# Patient Record
Sex: FEMALE | Race: WHITE | NOT HISPANIC OR LATINO
[De-identification: names, ages, dates, MRNs, and addresses within clinical notes are randomized per-mention and may not be internally consistent; named-entity substitution may affect disease eponyms.]

---

## 2017-04-25 ENCOUNTER — RESULT REVIEW (OUTPATIENT)
Age: 37
End: 2017-04-25

## 2018-09-21 ENCOUNTER — APPOINTMENT (OUTPATIENT)
Dept: SURGICAL ONCOLOGY | Facility: CLINIC | Age: 38
End: 2018-09-21
Payer: COMMERCIAL

## 2018-09-21 ENCOUNTER — LABORATORY RESULT (OUTPATIENT)
Age: 38
End: 2018-09-21

## 2018-09-21 VITALS
SYSTOLIC BLOOD PRESSURE: 127 MMHG | HEIGHT: 66 IN | WEIGHT: 140 LBS | RESPIRATION RATE: 15 BRPM | HEART RATE: 76 BPM | DIASTOLIC BLOOD PRESSURE: 86 MMHG | BODY MASS INDEX: 22.5 KG/M2

## 2018-09-21 PROCEDURE — 10022: CPT | Mod: 79

## 2018-09-21 PROCEDURE — 99244 OFF/OP CNSLTJ NEW/EST MOD 40: CPT

## 2019-03-15 ENCOUNTER — APPOINTMENT (OUTPATIENT)
Dept: SURGICAL ONCOLOGY | Facility: CLINIC | Age: 39
End: 2019-03-15

## 2019-03-15 DIAGNOSIS — N63.20 UNSPECIFIED LUMP IN THE LEFT BREAST, UNSPECIFIED QUADRANT: ICD-10-CM

## 2019-04-08 ENCOUNTER — APPOINTMENT (OUTPATIENT)
Dept: ANTEPARTUM | Facility: CLINIC | Age: 39
End: 2019-04-08
Payer: COMMERCIAL

## 2019-04-08 ENCOUNTER — ASOB RESULT (OUTPATIENT)
Age: 39
End: 2019-04-08

## 2019-04-08 PROCEDURE — 76811 OB US DETAILED SNGL FETUS: CPT

## 2019-04-08 PROCEDURE — 76817 TRANSVAGINAL US OBSTETRIC: CPT

## 2019-09-02 ENCOUNTER — INPATIENT (INPATIENT)
Facility: HOSPITAL | Age: 39
LOS: 1 days | Discharge: HOME | End: 2019-09-04
Attending: OBSTETRICS & GYNECOLOGY | Admitting: OBSTETRICS & GYNECOLOGY
Payer: COMMERCIAL

## 2019-09-02 VITALS
RESPIRATION RATE: 18 BRPM | OXYGEN SATURATION: 98 % | TEMPERATURE: 98 F | HEART RATE: 109 BPM | SYSTOLIC BLOOD PRESSURE: 97 MMHG | DIASTOLIC BLOOD PRESSURE: 53 MMHG

## 2019-09-02 DIAGNOSIS — O26.899 OTHER SPECIFIED PREGNANCY RELATED CONDITIONS, UNSPECIFIED TRIMESTER: ICD-10-CM

## 2019-09-02 DIAGNOSIS — Z3A.00 WEEKS OF GESTATION OF PREGNANCY NOT SPECIFIED: ICD-10-CM

## 2019-09-02 DIAGNOSIS — Z34.80 ENCOUNTER FOR SUPERVISION OF OTHER NORMAL PREGNANCY, UNSPECIFIED TRIMESTER: ICD-10-CM

## 2019-09-02 LAB
BASOPHILS # BLD AUTO: 0 K/UL — SIGNIFICANT CHANGE UP (ref 0–0.2)
BASOPHILS NFR BLD AUTO: 0.4 % — SIGNIFICANT CHANGE UP (ref 0–2)
BLD GP AB SCN SERPL QL: NEGATIVE — SIGNIFICANT CHANGE UP
EOSINOPHIL # BLD AUTO: 0.1 K/UL — SIGNIFICANT CHANGE UP (ref 0–0.5)
EOSINOPHIL NFR BLD AUTO: 1.2 % — SIGNIFICANT CHANGE UP (ref 0–6)
HCT VFR BLD CALC: 29 % — LOW (ref 34.5–45)
HCT VFR BLD CALC: 33.9 % — LOW (ref 34.5–45)
HGB BLD-MCNC: 11.5 G/DL — SIGNIFICANT CHANGE UP (ref 11.5–15.5)
HGB BLD-MCNC: 9.5 G/DL — LOW (ref 11.5–15.5)
LYMPHOCYTES # BLD AUTO: 2.1 K/UL — SIGNIFICANT CHANGE UP (ref 1–3.3)
LYMPHOCYTES # BLD AUTO: 26.7 % — SIGNIFICANT CHANGE UP (ref 13–44)
MCHC RBC-ENTMCNC: 28.7 PG — SIGNIFICANT CHANGE UP (ref 27–34)
MCHC RBC-ENTMCNC: 29.3 PG — SIGNIFICANT CHANGE UP (ref 27–34)
MCHC RBC-ENTMCNC: 32.6 GM/DL — SIGNIFICANT CHANGE UP (ref 32–36)
MCHC RBC-ENTMCNC: 34.1 GM/DL — SIGNIFICANT CHANGE UP (ref 32–36)
MCV RBC AUTO: 86 FL — SIGNIFICANT CHANGE UP (ref 80–100)
MCV RBC AUTO: 87.8 FL — SIGNIFICANT CHANGE UP (ref 80–100)
MONOCYTES # BLD AUTO: 0.6 K/UL — SIGNIFICANT CHANGE UP (ref 0–0.9)
MONOCYTES NFR BLD AUTO: 8.1 % — SIGNIFICANT CHANGE UP (ref 2–14)
NEUTROPHILS # BLD AUTO: 4.9 K/UL — SIGNIFICANT CHANGE UP (ref 1.8–7.4)
NEUTROPHILS NFR BLD AUTO: 63.6 % — SIGNIFICANT CHANGE UP (ref 43–77)
PLATELET # BLD AUTO: 168 K/UL — SIGNIFICANT CHANGE UP (ref 150–400)
PLATELET # BLD AUTO: 196 K/UL — SIGNIFICANT CHANGE UP (ref 150–400)
RBC # BLD: 3.3 M/UL — LOW (ref 3.8–5.2)
RBC # BLD: 3.94 M/UL — SIGNIFICANT CHANGE UP (ref 3.8–5.2)
RBC # FLD: 12.7 % — SIGNIFICANT CHANGE UP (ref 10.3–14.5)
RBC # FLD: 12.9 % — SIGNIFICANT CHANGE UP (ref 10.3–14.5)
RH IG SCN BLD-IMP: NEGATIVE — SIGNIFICANT CHANGE UP
RH IG SCN BLD-IMP: NEGATIVE — SIGNIFICANT CHANGE UP
T PALLIDUM AB TITR SER: NEGATIVE — SIGNIFICANT CHANGE UP
WBC # BLD: 16.7 K/UL — HIGH (ref 3.8–10.5)
WBC # BLD: 7.8 K/UL — SIGNIFICANT CHANGE UP (ref 3.8–10.5)
WBC # FLD AUTO: 16.7 K/UL — HIGH (ref 3.8–10.5)
WBC # FLD AUTO: 7.8 K/UL — SIGNIFICANT CHANGE UP (ref 3.8–10.5)

## 2019-09-02 RX ORDER — ACETAMINOPHEN 500 MG
1000 TABLET ORAL ONCE
Refills: 0 | Status: COMPLETED | OUTPATIENT
Start: 2019-09-02 | End: 2019-09-02

## 2019-09-02 RX ORDER — SODIUM CHLORIDE 9 MG/ML
1000 INJECTION, SOLUTION INTRAVENOUS ONCE
Refills: 0 | Status: COMPLETED | OUTPATIENT
Start: 2019-09-02 | End: 2019-09-02

## 2019-09-02 RX ORDER — OXYTOCIN 10 UNIT/ML
333.33 VIAL (ML) INJECTION
Qty: 20 | Refills: 0 | Status: COMPLETED | OUTPATIENT
Start: 2019-09-02 | End: 2019-09-02

## 2019-09-02 RX ORDER — OXYCODONE HYDROCHLORIDE 5 MG/1
5 TABLET ORAL ONCE
Refills: 0 | Status: DISCONTINUED | OUTPATIENT
Start: 2019-09-02 | End: 2019-09-04

## 2019-09-02 RX ORDER — TETANUS TOXOID, REDUCED DIPHTHERIA TOXOID AND ACELLULAR PERTUSSIS VACCINE, ADSORBED 5; 2.5; 8; 8; 2.5 [IU]/.5ML; [IU]/.5ML; UG/.5ML; UG/.5ML; UG/.5ML
0.5 SUSPENSION INTRAMUSCULAR ONCE
Refills: 0 | Status: COMPLETED | OUTPATIENT
Start: 2019-09-02

## 2019-09-02 RX ORDER — SODIUM CHLORIDE 9 MG/ML
1000 INJECTION, SOLUTION INTRAVENOUS
Refills: 0 | Status: DISCONTINUED | OUTPATIENT
Start: 2019-09-02 | End: 2019-09-02

## 2019-09-02 RX ORDER — SERTRALINE 25 MG/1
75 TABLET, FILM COATED ORAL DAILY
Refills: 0 | Status: DISCONTINUED | OUTPATIENT
Start: 2019-09-02 | End: 2019-09-04

## 2019-09-02 RX ORDER — MAGNESIUM HYDROXIDE 400 MG/1
30 TABLET, CHEWABLE ORAL
Refills: 0 | Status: DISCONTINUED | OUTPATIENT
Start: 2019-09-02 | End: 2019-09-04

## 2019-09-02 RX ORDER — LEVOTHYROXINE SODIUM 125 MCG
75 TABLET ORAL DAILY
Refills: 0 | Status: DISCONTINUED | OUTPATIENT
Start: 2019-09-02 | End: 2019-09-04

## 2019-09-02 RX ORDER — GLYCERIN ADULT
1 SUPPOSITORY, RECTAL RECTAL AT BEDTIME
Refills: 0 | Status: DISCONTINUED | OUTPATIENT
Start: 2019-09-02 | End: 2019-09-04

## 2019-09-02 RX ORDER — HYDROCORTISONE 1 %
1 OINTMENT (GRAM) TOPICAL EVERY 6 HOURS
Refills: 0 | Status: DISCONTINUED | OUTPATIENT
Start: 2019-09-02 | End: 2019-09-04

## 2019-09-02 RX ORDER — AER TRAVELER 0.5 G/1
1 SOLUTION RECTAL; TOPICAL EVERY 4 HOURS
Refills: 0 | Status: DISCONTINUED | OUTPATIENT
Start: 2019-09-02 | End: 2019-09-04

## 2019-09-02 RX ORDER — KETOROLAC TROMETHAMINE 30 MG/ML
30 SYRINGE (ML) INJECTION ONCE
Refills: 0 | Status: DISCONTINUED | OUTPATIENT
Start: 2019-09-02 | End: 2019-09-02

## 2019-09-02 RX ORDER — PRAMOXINE HYDROCHLORIDE 150 MG/15G
1 AEROSOL, FOAM RECTAL EVERY 4 HOURS
Refills: 0 | Status: DISCONTINUED | OUTPATIENT
Start: 2019-09-02 | End: 2019-09-04

## 2019-09-02 RX ORDER — DIBUCAINE 1 %
1 OINTMENT (GRAM) RECTAL EVERY 6 HOURS
Refills: 0 | Status: DISCONTINUED | OUTPATIENT
Start: 2019-09-02 | End: 2019-09-04

## 2019-09-02 RX ORDER — OXYTOCIN 10 UNIT/ML
333.33 VIAL (ML) INJECTION
Qty: 20 | Refills: 0 | Status: DISCONTINUED | OUTPATIENT
Start: 2019-09-02 | End: 2019-09-04

## 2019-09-02 RX ORDER — SODIUM CHLORIDE 9 MG/ML
3 INJECTION INTRAMUSCULAR; INTRAVENOUS; SUBCUTANEOUS EVERY 8 HOURS
Refills: 0 | Status: DISCONTINUED | OUTPATIENT
Start: 2019-09-02 | End: 2019-09-04

## 2019-09-02 RX ORDER — ACETAMINOPHEN 500 MG
975 TABLET ORAL
Refills: 0 | Status: DISCONTINUED | OUTPATIENT
Start: 2019-09-02 | End: 2019-09-04

## 2019-09-02 RX ORDER — CITRIC ACID/SODIUM CITRATE 300-500 MG
15 SOLUTION, ORAL ORAL EVERY 6 HOURS
Refills: 0 | Status: DISCONTINUED | OUTPATIENT
Start: 2019-09-02 | End: 2019-09-02

## 2019-09-02 RX ORDER — LANOLIN
1 OINTMENT (GRAM) TOPICAL EVERY 6 HOURS
Refills: 0 | Status: DISCONTINUED | OUTPATIENT
Start: 2019-09-02 | End: 2019-09-04

## 2019-09-02 RX ORDER — OXYCODONE HYDROCHLORIDE 5 MG/1
5 TABLET ORAL
Refills: 0 | Status: DISCONTINUED | OUTPATIENT
Start: 2019-09-02 | End: 2019-09-04

## 2019-09-02 RX ORDER — AMPICILLIN TRIHYDRATE 250 MG
2 CAPSULE ORAL ONCE
Refills: 0 | Status: COMPLETED | OUTPATIENT
Start: 2019-09-02 | End: 2019-09-02

## 2019-09-02 RX ORDER — AMPICILLIN TRIHYDRATE 250 MG
2 CAPSULE ORAL ONCE
Refills: 0 | Status: DISCONTINUED | OUTPATIENT
Start: 2019-09-02 | End: 2019-09-04

## 2019-09-02 RX ORDER — SIMETHICONE 80 MG/1
80 TABLET, CHEWABLE ORAL EVERY 4 HOURS
Refills: 0 | Status: DISCONTINUED | OUTPATIENT
Start: 2019-09-02 | End: 2019-09-04

## 2019-09-02 RX ORDER — DIPHENHYDRAMINE HCL 50 MG
25 CAPSULE ORAL EVERY 6 HOURS
Refills: 0 | Status: DISCONTINUED | OUTPATIENT
Start: 2019-09-02 | End: 2019-09-04

## 2019-09-02 RX ORDER — BENZOCAINE 10 %
1 GEL (GRAM) MUCOUS MEMBRANE EVERY 6 HOURS
Refills: 0 | Status: DISCONTINUED | OUTPATIENT
Start: 2019-09-02 | End: 2019-09-04

## 2019-09-02 RX ORDER — OXYTOCIN 10 UNIT/ML
10 VIAL (ML) INJECTION ONCE
Refills: 0 | Status: COMPLETED | OUTPATIENT
Start: 2019-09-02 | End: 2019-09-02

## 2019-09-02 RX ORDER — AMPICILLIN TRIHYDRATE 250 MG
1 CAPSULE ORAL EVERY 4 HOURS
Refills: 0 | Status: DISCONTINUED | OUTPATIENT
Start: 2019-09-02 | End: 2019-09-02

## 2019-09-02 RX ORDER — DOCUSATE SODIUM 100 MG
100 CAPSULE ORAL
Refills: 0 | Status: DISCONTINUED | OUTPATIENT
Start: 2019-09-02 | End: 2019-09-03

## 2019-09-02 RX ORDER — IBUPROFEN 200 MG
600 TABLET ORAL EVERY 6 HOURS
Refills: 0 | Status: COMPLETED | OUTPATIENT
Start: 2019-09-02 | End: 2020-07-31

## 2019-09-02 RX ORDER — IBUPROFEN 200 MG
600 TABLET ORAL EVERY 6 HOURS
Refills: 0 | Status: DISCONTINUED | OUTPATIENT
Start: 2019-09-02 | End: 2019-09-04

## 2019-09-02 RX ADMIN — Medication 400 MILLIGRAM(S): at 08:15

## 2019-09-02 RX ADMIN — Medication 30 MILLIGRAM(S): at 05:00

## 2019-09-02 RX ADMIN — Medication 600 MILLIGRAM(S): at 17:30

## 2019-09-02 RX ADMIN — Medication 600 MILLIGRAM(S): at 11:45

## 2019-09-02 RX ADMIN — Medication 600 MILLIGRAM(S): at 12:30

## 2019-09-02 RX ADMIN — Medication 975 MILLIGRAM(S): at 21:00

## 2019-09-02 RX ADMIN — SODIUM CHLORIDE 1000 MILLILITER(S): 9 INJECTION, SOLUTION INTRAVENOUS at 08:05

## 2019-09-02 RX ADMIN — Medication 10 UNIT(S): at 04:15

## 2019-09-02 RX ADMIN — Medication 1000 MILLIUNIT(S)/MIN: at 05:53

## 2019-09-02 RX ADMIN — Medication 1000 MILLIGRAM(S): at 09:30

## 2019-09-02 RX ADMIN — Medication 1000 MILLIUNIT(S)/MIN: at 08:25

## 2019-09-02 RX ADMIN — Medication 30 MILLIGRAM(S): at 04:20

## 2019-09-02 RX ADMIN — Medication 216 GRAM(S): at 03:59

## 2019-09-02 RX ADMIN — SODIUM CHLORIDE 1000 MILLILITER(S): 9 INJECTION, SOLUTION INTRAVENOUS at 05:06

## 2019-09-02 RX ADMIN — Medication 975 MILLIGRAM(S): at 21:30

## 2019-09-03 LAB
HCT VFR BLD CALC: 17.4 % — CRITICAL LOW (ref 34.5–45)
HCT VFR BLD CALC: 19 % — CRITICAL LOW (ref 34.5–45)
HCT VFR BLD CALC: 19.4 % — CRITICAL LOW (ref 34.5–45)
HGB BLD-MCNC: 6.2 G/DL — CRITICAL LOW (ref 11.5–15.5)
HGB BLD-MCNC: 6.5 G/DL — CRITICAL LOW (ref 11.5–15.5)
HGB BLD-MCNC: 6.8 G/DL — CRITICAL LOW (ref 11.5–15.5)
KLEIHAUER-BETKE CALCULATION: 0 % — SIGNIFICANT CHANGE UP (ref 0–0.3)
MCHC RBC-ENTMCNC: 29.6 PG — SIGNIFICANT CHANGE UP (ref 27–34)
MCHC RBC-ENTMCNC: 30.1 PG — SIGNIFICANT CHANGE UP (ref 27–34)
MCHC RBC-ENTMCNC: 34.4 GM/DL — SIGNIFICANT CHANGE UP (ref 32–36)
MCHC RBC-ENTMCNC: 35 GM/DL — SIGNIFICANT CHANGE UP (ref 32–36)
MCV RBC AUTO: 86.1 FL — SIGNIFICANT CHANGE UP (ref 80–100)
MCV RBC AUTO: 86.2 FL — SIGNIFICANT CHANGE UP (ref 80–100)
PLATELET # BLD AUTO: 116 K/UL — LOW (ref 150–400)
PLATELET # BLD AUTO: 122 K/UL — LOW (ref 150–400)
RBC # BLD: 2.21 M/UL — LOW (ref 3.8–5.2)
RBC # BLD: 2.26 M/UL — LOW (ref 3.8–5.2)
RBC # FLD: 13.2 % — SIGNIFICANT CHANGE UP (ref 10.3–14.5)
RBC # FLD: 13.4 % — SIGNIFICANT CHANGE UP (ref 10.3–14.5)
WBC # BLD: 8.7 K/UL — SIGNIFICANT CHANGE UP (ref 3.8–10.5)
WBC # BLD: 9.8 K/UL — SIGNIFICANT CHANGE UP (ref 3.8–10.5)
WBC # FLD AUTO: 8.7 K/UL — SIGNIFICANT CHANGE UP (ref 3.8–10.5)
WBC # FLD AUTO: 9.8 K/UL — SIGNIFICANT CHANGE UP (ref 3.8–10.5)

## 2019-09-03 RX ORDER — DOCUSATE SODIUM 100 MG
100 CAPSULE ORAL THREE TIMES A DAY
Refills: 0 | Status: DISCONTINUED | OUTPATIENT
Start: 2019-09-03 | End: 2019-09-04

## 2019-09-03 RX ORDER — DIPHENHYDRAMINE HCL 50 MG
25 CAPSULE ORAL ONCE
Refills: 0 | Status: DISCONTINUED | OUTPATIENT
Start: 2019-09-03 | End: 2019-09-04

## 2019-09-03 RX ORDER — FERROUS SULFATE 325(65) MG
325 TABLET ORAL THREE TIMES A DAY
Refills: 0 | Status: DISCONTINUED | OUTPATIENT
Start: 2019-09-03 | End: 2019-09-04

## 2019-09-03 RX ORDER — ACETAMINOPHEN 500 MG
650 TABLET ORAL ONCE
Refills: 0 | Status: DISCONTINUED | OUTPATIENT
Start: 2019-09-03 | End: 2019-09-04

## 2019-09-03 RX ORDER — TETANUS TOXOID, REDUCED DIPHTHERIA TOXOID AND ACELLULAR PERTUSSIS VACCINE, ADSORBED 5; 2.5; 8; 8; 2.5 [IU]/.5ML; [IU]/.5ML; UG/.5ML; UG/.5ML; UG/.5ML
0.5 SUSPENSION INTRAMUSCULAR ONCE
Refills: 0 | Status: COMPLETED | OUTPATIENT
Start: 2019-09-03 | End: 2019-09-03

## 2019-09-03 RX ORDER — ASCORBIC ACID 60 MG
500 TABLET,CHEWABLE ORAL DAILY
Refills: 0 | Status: DISCONTINUED | OUTPATIENT
Start: 2019-09-03 | End: 2019-09-04

## 2019-09-03 RX ORDER — INFLUENZA VIRUS VACCINE 15; 15; 15; 15 UG/.5ML; UG/.5ML; UG/.5ML; UG/.5ML
0.5 SUSPENSION INTRAMUSCULAR ONCE
Refills: 0 | Status: COMPLETED | OUTPATIENT
Start: 2019-09-03 | End: 2019-09-03

## 2019-09-03 RX ADMIN — Medication 325 MILLIGRAM(S): at 16:04

## 2019-09-03 RX ADMIN — Medication 975 MILLIGRAM(S): at 22:54

## 2019-09-03 RX ADMIN — Medication 600 MILLIGRAM(S): at 18:32

## 2019-09-03 RX ADMIN — Medication 975 MILLIGRAM(S): at 16:38

## 2019-09-03 RX ADMIN — Medication 600 MILLIGRAM(S): at 13:10

## 2019-09-03 RX ADMIN — Medication 975 MILLIGRAM(S): at 03:01

## 2019-09-03 RX ADMIN — Medication 100 MILLIGRAM(S): at 09:09

## 2019-09-03 RX ADMIN — Medication 75 MICROGRAM(S): at 06:18

## 2019-09-03 RX ADMIN — Medication 975 MILLIGRAM(S): at 21:54

## 2019-09-03 RX ADMIN — Medication 600 MILLIGRAM(S): at 06:19

## 2019-09-03 RX ADMIN — Medication 600 MILLIGRAM(S): at 12:41

## 2019-09-03 RX ADMIN — TETANUS TOXOID, REDUCED DIPHTHERIA TOXOID AND ACELLULAR PERTUSSIS VACCINE, ADSORBED 0.5 MILLILITER(S): 5; 2.5; 8; 8; 2.5 SUSPENSION INTRAMUSCULAR at 21:56

## 2019-09-03 RX ADMIN — Medication 100 MILLIGRAM(S): at 16:03

## 2019-09-03 RX ADMIN — Medication 975 MILLIGRAM(S): at 03:30

## 2019-09-03 RX ADMIN — Medication 975 MILLIGRAM(S): at 09:40

## 2019-09-03 RX ADMIN — SERTRALINE 75 MILLIGRAM(S): 25 TABLET, FILM COATED ORAL at 06:21

## 2019-09-03 RX ADMIN — Medication 500 MILLIGRAM(S): at 12:41

## 2019-09-03 RX ADMIN — Medication 975 MILLIGRAM(S): at 09:10

## 2019-09-03 RX ADMIN — INFLUENZA VIRUS VACCINE 0.5 MILLILITER(S): 15; 15; 15; 15 SUSPENSION INTRAMUSCULAR at 21:55

## 2019-09-03 RX ADMIN — Medication 600 MILLIGRAM(S): at 00:35

## 2019-09-03 RX ADMIN — Medication 1 TABLET(S): at 12:41

## 2019-09-03 RX ADMIN — Medication 975 MILLIGRAM(S): at 16:03

## 2019-09-03 RX ADMIN — Medication 600 MILLIGRAM(S): at 00:07

## 2019-09-03 RX ADMIN — Medication 325 MILLIGRAM(S): at 09:09

## 2019-09-03 RX ADMIN — Medication 100 MILLIGRAM(S): at 06:18

## 2019-09-03 NOTE — CHART NOTE - NSCHARTNOTEFT_GEN_A_CORE
R1 Postpartum Note - Seen with Dr. Schwartz    S: 38 yo PPD1  c/b vasovagal episode PPD1 and Hct 29->17.4->19, Hgb 6.2->6.5. Repeat Crit stable around 19, denies vaginal bleeding greater than 1 pad per hour or passage of golf-ball sized clots. States she is still feeling foggy and slightly lightheaded when she stands. Denies dizziness, sob, cp, or palpitations.    O:   VS  T(C): 36.7 (19 @ 05:00)  HR: 66 (19 @ 05:00)  BP: 103/63 (19 @ 05:00)  RR: 18 (19 @ 05:00)  SpO2: 98% (19 @ 05:00)               6.5    8.7   )-----------( 116      (  @ 06:08 )             19.0       PE  Gen aaox3, nad  Abd soft, nd, fundus firm and below umbilicus  Vaginal Exam - wnl, no hematoma present performed by Dr. Schwartz PGY4  Ext nonedematous, nontender    A/P: 38 yo PPD1  with Hct drop 29->17.4->19, symptomatic with lightheadedness and fogginess, no hematoma present.  - 2 u pRBC  - 4 hour CBC follow up  - monitor vitals and symptoms    d/w Dr. Chan and Dr. Lana Faria PGY1

## 2019-09-03 NOTE — PROVIDER CONTACT NOTE (CRITICAL VALUE NOTIFICATION) - PERSON GIVING RESULT:
Therapy: Enteral  Insurance: [employer]  Ded: $0  Met: $0    Co-Insurance:0  Max Out of Pocket: $0  Met: $0    Please contact Intake with any questions, [phone] or In Basket pool, [facility] ([phone]).    In reference to admission on 9/24/17 to check for enteral nutrition benefits.    Laboratory- [facility]

## 2019-09-03 NOTE — PROVIDER CONTACT NOTE (CRITICAL VALUE NOTIFICATION) - SITUATION
Pt's H&H 19.4/ 6.8. Post partum day 1 breast and bottle feeding V/S within normal limit no distress noted  n
Patient is a day 1 . postpartum H&H was collected. Hemoglobin was 6.2, and hematocrit was 17.4.
low H&H  6.5/19.0

## 2019-09-03 NOTE — PROVIDER CONTACT NOTE (CRITICAL VALUE NOTIFICATION) - ACTION/TREATMENT ORDERED:
No treatment order given
Provider to come assess. STAT CBC to be drawn
Said ok, will discuss with attending and let us know.

## 2019-09-03 NOTE — PROVIDER CONTACT NOTE (CRITICAL VALUE NOTIFICATION) - ASSESSMENT
Md came to assess  the pt ordered blood transfusion   but pt refused
Patient vitals are stable.
Pt is asymptomatic,  Vitals are within normal range

## 2019-09-03 NOTE — PROVIDER CONTACT NOTE (CRITICAL VALUE NOTIFICATION) - BACKGROUND
Multip NSD day 1
Multip, Post Vag delivery Day-1 . With Low H&H
Patient delivered via NSD yesterday 09/02/2019. EBL was 400ml.

## 2019-09-04 ENCOUNTER — TRANSCRIPTION ENCOUNTER (OUTPATIENT)
Age: 39
End: 2019-09-04

## 2019-09-04 VITALS
RESPIRATION RATE: 18 BRPM | TEMPERATURE: 98 F | DIASTOLIC BLOOD PRESSURE: 68 MMHG | SYSTOLIC BLOOD PRESSURE: 105 MMHG | HEART RATE: 81 BPM

## 2019-09-04 DIAGNOSIS — F32.9 MAJOR DEPRESSIVE DISORDER, SINGLE EPISODE, UNSPECIFIED: ICD-10-CM

## 2019-09-04 DIAGNOSIS — E03.9 HYPOTHYROIDISM, UNSPECIFIED: ICD-10-CM

## 2019-09-04 LAB
HCT VFR BLD CALC: 18.9 % — CRITICAL LOW (ref 34.5–45)
HCT VFR BLD CALC: 19.1 % — CRITICAL LOW (ref 34.5–45)
HGB BLD-MCNC: 6.1 G/DL — CRITICAL LOW (ref 11.5–15.5)
HGB BLD-MCNC: 6.4 G/DL — CRITICAL LOW (ref 11.5–15.5)
MCHC RBC-ENTMCNC: 29 PG — SIGNIFICANT CHANGE UP (ref 27–34)
MCHC RBC-ENTMCNC: 29.9 PG — SIGNIFICANT CHANGE UP (ref 27–34)
MCHC RBC-ENTMCNC: 32.3 GM/DL — SIGNIFICANT CHANGE UP (ref 32–36)
MCHC RBC-ENTMCNC: 33.8 GM/DL — SIGNIFICANT CHANGE UP (ref 32–36)
MCV RBC AUTO: 88.6 FL — SIGNIFICANT CHANGE UP (ref 80–100)
MCV RBC AUTO: 90 FL — SIGNIFICANT CHANGE UP (ref 80–100)
PLATELET # BLD AUTO: 136 K/UL — LOW (ref 150–400)
PLATELET # BLD AUTO: 143 K/UL — LOW (ref 150–400)
RBC # BLD: 2.1 M/UL — LOW (ref 3.8–5.2)
RBC # BLD: 2.15 M/UL — LOW (ref 3.8–5.2)
RBC # FLD: 13.5 % — SIGNIFICANT CHANGE UP (ref 10.3–14.5)
RBC # FLD: 14.9 % — HIGH (ref 10.3–14.5)
WBC # BLD: 8.14 K/UL — SIGNIFICANT CHANGE UP (ref 3.8–10.5)
WBC # BLD: 8.9 K/UL — SIGNIFICANT CHANGE UP (ref 3.8–10.5)
WBC # FLD AUTO: 8.14 K/UL — SIGNIFICANT CHANGE UP (ref 3.8–10.5)
WBC # FLD AUTO: 8.9 K/UL — SIGNIFICANT CHANGE UP (ref 3.8–10.5)

## 2019-09-04 PROCEDURE — 85460 HEMOGLOBIN FETAL: CPT

## 2019-09-04 PROCEDURE — 86780 TREPONEMA PALLIDUM: CPT

## 2019-09-04 PROCEDURE — 90686 IIV4 VACC NO PRSV 0.5 ML IM: CPT

## 2019-09-04 PROCEDURE — 86850 RBC ANTIBODY SCREEN: CPT

## 2019-09-04 PROCEDURE — 90715 TDAP VACCINE 7 YRS/> IM: CPT

## 2019-09-04 PROCEDURE — G0463: CPT

## 2019-09-04 PROCEDURE — 86901 BLOOD TYPING SEROLOGIC RH(D): CPT

## 2019-09-04 PROCEDURE — 59050 FETAL MONITOR W/REPORT: CPT

## 2019-09-04 PROCEDURE — 85018 HEMOGLOBIN: CPT

## 2019-09-04 PROCEDURE — 59025 FETAL NON-STRESS TEST: CPT

## 2019-09-04 PROCEDURE — 86900 BLOOD TYPING SEROLOGIC ABO: CPT

## 2019-09-04 PROCEDURE — 85027 COMPLETE CBC AUTOMATED: CPT

## 2019-09-04 PROCEDURE — 85014 HEMATOCRIT: CPT

## 2019-09-04 RX ORDER — ACETAMINOPHEN 500 MG
3 TABLET ORAL
Qty: 0 | Refills: 0 | DISCHARGE
Start: 2019-09-04

## 2019-09-04 RX ORDER — FERROUS SULFATE 325(65) MG
1 TABLET ORAL
Qty: 30 | Refills: 0
Start: 2019-09-04 | End: 2019-10-03

## 2019-09-04 RX ORDER — MAGNESIUM SULFATE 500 MG/ML
1 VIAL (ML) INJECTION
Refills: 0 | Status: DISCONTINUED | OUTPATIENT
Start: 2019-09-04 | End: 2019-09-04

## 2019-09-04 RX ORDER — ACETAMINOPHEN 500 MG
2 TABLET ORAL
Qty: 0 | Refills: 0 | DISCHARGE
Start: 2019-09-04

## 2019-09-04 RX ADMIN — Medication 975 MILLIGRAM(S): at 07:00

## 2019-09-04 RX ADMIN — Medication 600 MILLIGRAM(S): at 01:50

## 2019-09-04 RX ADMIN — Medication 975 MILLIGRAM(S): at 13:10

## 2019-09-04 RX ADMIN — Medication 1 TABLET(S): at 11:45

## 2019-09-04 RX ADMIN — Medication 100 MILLIGRAM(S): at 00:51

## 2019-09-04 RX ADMIN — Medication 600 MILLIGRAM(S): at 00:50

## 2019-09-04 RX ADMIN — Medication 975 MILLIGRAM(S): at 12:36

## 2019-09-04 RX ADMIN — Medication 500 MILLIGRAM(S): at 09:20

## 2019-09-04 RX ADMIN — Medication 325 MILLIGRAM(S): at 00:50

## 2019-09-04 RX ADMIN — Medication 100 MILLIGRAM(S): at 09:21

## 2019-09-04 RX ADMIN — Medication 975 MILLIGRAM(S): at 06:11

## 2019-09-04 RX ADMIN — Medication 600 MILLIGRAM(S): at 09:20

## 2019-09-04 RX ADMIN — Medication 325 MILLIGRAM(S): at 09:21

## 2019-09-04 RX ADMIN — Medication 75 MICROGRAM(S): at 06:11

## 2019-09-04 RX ADMIN — SERTRALINE 75 MILLIGRAM(S): 25 TABLET, FILM COATED ORAL at 06:11

## 2019-09-04 RX ADMIN — Medication 600 MILLIGRAM(S): at 10:20

## 2019-09-04 NOTE — PROGRESS NOTE ADULT - ASSESSMENT
A/P: 40yo PPD#1 s/p .  Patient is stable and doing well post-partum. Critical HCT of 17.4 notified at 540a. Patient is currently asymptomatic w/o signs of active bleeding. VSS. Patient did have a sulcur tear in addition to second degree as well c/b vasovagal episode after delivery. Both were repaired appropriately. Possible hematoma vs lab error.
A/P: 40 yo PPD#1 s/p .  Patient is stable and doing well post-partum. Patient is currently asymptomatic w/o signs of active bleeding. VSS. Patient did have a sulcur tear in addition to second degree as well c/b vasovagal episode after delivery. Both were repaired appropriately.     PMHx: hypothryoidism, anxiety  Current Issues: acute blood loss anemia - pt declined tranfusion yesterday, but is currently asymptomatic

## 2019-09-04 NOTE — PROGRESS NOTE ADULT - SUBJECTIVE AND OBJECTIVE BOX
OB Progress Note:  PPD#1    S: 40yo  PPD#1 s/p . Patient feels well. Pain is well controlled. She is tolerating a regular diet and passing flatus. She is voiding spontaneously, and ambulating without difficulty. Denies CP/SOB. Denies lightheadedness/dizziness. Denies N/V.    O:  Vitals:  Vital Signs Last 24 Hrs  T(C): 36.7 (03 Sep 2019 05:00), Max: 37.1 (02 Sep 2019 12:39)  T(F): 98.1 (03 Sep 2019 05:00), Max: 98.7 (02 Sep 2019 12:39)  HR: 66 (03 Sep 2019 05:00) (62 - 110)  BP: 103/63 (03 Sep 2019 05:00) (79/51 - 117/56)  BP(mean): --  RR: 18 (03 Sep 2019 05:00) (16 - 18)  SpO2: 98% (03 Sep 2019 05:00) (97% - 100%)    MEDICATIONS  (STANDING):  acetaminophen   Tablet .. 975 milliGRAM(s) Oral <User Schedule>  ampicillin  IVPB 2 Gram(s) IV Intermittent once  diphtheria/tetanus/pertussis (acellular) Vaccine (ADAcel) 0.5 milliLiter(s) IntraMuscular once  ibuprofen  Tablet. 600 milliGRAM(s) Oral every 6 hours  levothyroxine 75 MICROGram(s) Oral daily  oxytocin Infusion 333.333 milliUNIT(s)/Min (1000 mL/Hr) IV Continuous <Continuous>  prenatal multivitamin 1 Tablet(s) Oral daily  sertraline 75 milliGRAM(s) Oral daily  sodium chloride 0.9% lock flush 3 milliLiter(s) IV Push every 8 hours      Labs:  Blood type: A Negative  Rubella IgG: RPR: Negative                          6.2<LL>   -- >-----------< --    (  @ 05:14 )             17.4<LL>                        9.5<L>   16.7<H> >-----------< 196    (  @ 05:15 )             29.0<L>                        11.5   7.8 >-----------< 168    (  @ 04:27 )             33.9<L>        Physical Exam:  General: NAD  CV: RRR w/o R/M/G  Pulm: CTA bl  Abdomen: soft, non-tender, non-distended, fundus firm  Vaginal: Lochia wnl  Extremities: No erythema/edema
PPD#1- ATTENDING NOTE    S: Patient doing well. Minimal lochia. Pain controlled.    O: Vital Signs Last 24 Hrs  T(C): 36.7 (03 Sep 2019 05:00), Max: 37.1 (02 Sep 2019 12:39)  T(F): 98.1 (03 Sep 2019 05:00), Max: 98.7 (02 Sep 2019 12:39)  HR: 66 (03 Sep 2019 05:00) (62 - 89)  BP: 103/63 (03 Sep 2019 05:00) (102/57 - 117/56)  BP(mean): --  RR: 18 (03 Sep 2019 05:00) (16 - 18)  SpO2: 98% (03 Sep 2019 05:00) (97% - 100%)    Gen: NAD  Abd: soft, NT, ND, fundus firm below umbilicus  Lochia: moderate  Ext: no tenderness, no hyper reflexia  Voiding well    Labs:                        6.5    8.7   )-----------( 116      ( 03 Sep 2019 06:08 )             19.0     ABO Interpretation: A ( @ 04:13)  Rh Interpretation: Negative ( @ 04:13)  ABO Interpretation: A ( @ 04:13)  Rh Interpretation: Negative ( @ 04:13)    Antibody Screen Negative    A: 39y PPD# s/p  with anemia.  h/h is low but stable.  Patient was examined by the residents an hour or two ago to check for hematoma and none present.  Pulse is stable.  Patient is feeling a little dizzy  when she gets up and a little "foggy".  I told her that I would like to give her 2 units of blood.  I  explained that her h/h are very low and she does not have a lot of room to go if she has any significant bleeding.  I also told  her that I was concerned that she could pass out at home.  She refuses blood at this time.  I will start iron and repeat the h/h in 4 hours (after last).  If stable, will not repeat again and will just continue vit an iron.  baby is A pos.  rhogam is needed (if not already given).      Plan:  Analgesia prn  Regular diet        Office [phone]  Dr. Chan
Received call from PGY1 about critical value of H/H. He had evaluated the pt and vital stable, currently asymptomatic. Fundus firm per him. Lochia normal. Pt with near syncopal event yesterday upon my arrival to the hospital. I had arrived shortly after delivery and after repair had concluded Upon my arrival pt was comfortable in bed holding her infant and then started feeling dizzy. BP dropped and after giving her a liter of fluid and placing in trendelenberg she responded and felt better. At that time I had done a vaginal exam to make sure no developing hematoma and none was palpated. However it is possible that one could have developed since. Discussed with resident over phone. As pt asymptomatic will repeat CBC stat and pursue any further imaging pending results    Kelsey Beltran, DO
Postpartum Note- PPD#2    Allergies: No Known Allergies    Patient w/o complaints, pain is controlled.  Pt is OOB, tolerating PO, passing flatus/voiding freely. Lochia WNL. Denies HA, dizziness, CP/SOB, palpitations. Denies dizziness with ambulation    O:  Vital Signs Last 24 Hrs  T(C): 36.8 (04 Sep 2019 05:24), Max: 37 (03 Sep 2019 09:00)  T(F): 98.2 (04 Sep 2019 05:24), Max: 98.6 (03 Sep 2019 09:00)  HR: 81 (04 Sep 2019 05:24) (74 - 81)  BP: 105/68 (04 Sep 2019 05:24) (100/61 - 109/70)  BP(mean): --  RR: 18 (04 Sep 2019 05:24) (17 - 18)  SpO2: 98% (03 Sep 2019 17:02) (98% - 98%)     Gen: NAD  Abdomen: Soft, nontender, non-distended, fundus firm.  Lochia WNL  Ext: Neg edema, Neg calf tenderness    LABS:               6.8    9.8   )-----------( 122      ( 09-03 @ 11:43 )             19.4                6.5    8.7   )-----------( 116      ( 09-03 @ 06:08 )             19.0                6.2    x     )-----------( x        ( 09-03 @ 05:14 )             17.4                9.5    16.7  )-----------( 196      ( 09-02 @ 05:15 )             29.0                11.5   7.8   )-----------( 168      ( 09-02 @ 04:27 )             33.9

## 2019-09-04 NOTE — PROVIDER CONTACT NOTE (OTHER) - ASSESSMENT
pt has ringing in ears and feels dizzy. pulse is 72. pulse ox is 99 and blood pressure is 114/62
pt is asymptomatic, vital signs WNL, no distress noted, denies headache, no dizziness with  ambulation

## 2019-09-04 NOTE — DISCHARGE NOTE OB - PATIENT PORTAL LINK FT
You can access the FollowMyHealth Patient Portal offered by [facility] by registering at the following website: [email or web address]. By joining [facility]’s FollowMyHealth portal, you will also be able to view your health information using other applications (apps) compatible with our system.

## 2019-09-04 NOTE — DISCHARGE NOTE OB - CARE PLAN
Principal Discharge DX:	Vaginal delivery  Goal:	d/c home  Assessment and plan of treatment:	ppv 6 weeks  iron q d  f/u with endocrine  Secondary Diagnosis:	Hypothyroidism, unspecified type

## 2019-09-04 NOTE — PROGRESS NOTE ADULT - PROBLEM SELECTOR PLAN 1
- STAT CBC to confirm lab values  - Pain well controlled, continue current pain regimen  - Increase ambulation, SCDs when not ambulating  - Continue regular diet    d/w Dr.Paniccia Dayday Argueta PGY1
Increase OOB  Regular diet  PO Pain protocol  AM H&H pending  Continue routine prenatal care

## 2019-09-04 NOTE — DISCHARGE NOTE OB - MEDICATION SUMMARY - MEDICATIONS TO STOP TAKING
I will STOP taking the medications listed below when I get home from the hospital:    Vicodin 300 mg-5 mg oral tablet  -- 1 tab(s) by mouth every 6 hours, As Needed

## 2019-09-04 NOTE — DISCHARGE NOTE OB - CARE PROVIDER_API CALL
Kartik Trinidad)  Obstetrics and Gynecology  [address], [address]  [place] [address]  Phone: [phone]  Fax: [phone]  Follow Up Time:

## 2019-09-04 NOTE — DISCHARGE NOTE OB - MEDICATION SUMMARY - MEDICATIONS TO TAKE
I will START or STAY ON the medications listed below when I get home from the hospital:    acetaminophen 325 mg oral tablet  -- 3 tab(s) by mouth   -- Indication: For Vaginal delivery    acetaminophen 325 mg oral tablet  -- 2 tab(s) by mouth once  -- Indication: For Vaginal delivery    ferrous sulfate 325 mg (65 mg elemental iron) oral tablet  -- 1 tab(s) by mouth once a day   -- Indication: For anemia    Prenatal Multivitamins with Folic Acid 1 mg oral tablet  -- 1 tab(s) by mouth once a day  -- Indication: For Vaginal delivery    Synthroid 75 mcg (0.075 mg) oral tablet  -- 1 tab(s) by mouth once a day  -- Indication: For Hypothyroidism, unspecified type

## 2019-09-04 NOTE — PROVIDER CONTACT NOTE (OTHER) - BACKGROUND
ebl 400 ; im pitocin 10 units at 0415.
pt is a multip  post partum day 2, being discharged today, refused a blood transfusion yesterday with a low H&H

## 2019-09-04 NOTE — PROVIDER CONTACT NOTE (OTHER) - SITUATION
pt had a precipitouc delivery at 0409
pts H&H 6.4/19.1
saw Dr. Trinidad on the floor and gave him an update on pts H&H  as per Dr. Trinidad its ok to discharge the pt

## 2019-09-04 NOTE — PROVIDER CONTACT NOTE (OTHER) - RECOMMENDATIONS
LR Bolus
pt already has a discharge order from  Dr. Trinidad  as per Luciana, its ok to discharge pt

## 2019-09-04 NOTE — PROGRESS NOTE ADULT - ATTENDING COMMENTS
PT DOING WELL.   VSS  FIRM FUNDUS  STABLE  ANEMIA 2 TO ACUTE BLD LOSS AT DELIVERY- IRON  D/C HOME  INSTR GIVEN  PPV 6 WEEKS    SHIRA MCCOY

## 2019-10-16 ENCOUNTER — RESULT REVIEW (OUTPATIENT)
Age: 39
End: 2019-10-16

## 2021-03-11 ENCOUNTER — RESULT REVIEW (OUTPATIENT)
Age: 41
End: 2021-03-11

## 2022-03-16 ENCOUNTER — RESULT REVIEW (OUTPATIENT)
Age: 42
End: 2022-03-16